# Patient Record
Sex: MALE | URBAN - METROPOLITAN AREA
[De-identification: names, ages, dates, MRNs, and addresses within clinical notes are randomized per-mention and may not be internally consistent; named-entity substitution may affect disease eponyms.]

---

## 2024-05-16 ENCOUNTER — TELEPHONE (OUTPATIENT)
Dept: TRANSPLANT | Facility: CLINIC | Age: 69
End: 2024-05-16

## 2024-05-23 ENCOUNTER — TELEPHONE (OUTPATIENT)
Dept: TRANSPLANT | Facility: CLINIC | Age: 69
End: 2024-05-23

## 2024-06-17 ENCOUNTER — TELEPHONE (OUTPATIENT)
Dept: TRANSPLANT | Facility: CLINIC | Age: 69
End: 2024-06-17

## 2024-06-17 NOTE — TELEPHONE ENCOUNTER
----- Message from Germaine Evans DO sent at 6/17/2024  3:07 PM CDT -----  Regarding: RE: High Risk Referral  Does not sound like a candidate (midodrine use, hx of systolic HF, COPD with concurrent Plavix/Eliquis)    Germaine  ----- Message -----  From: Venu Brenner RN  Sent: 6/17/2024   2:51 PM CDT  To: Claudio Brito MD; Tena Newby MD; #  Subject: High Risk Referral                               Good afternoon Jacky,    I received a referral on Mr. Chambers, a 68 year old Male who started dialysis (9/9/2023). His history includes DM, HTN, DVT, Chronic systolic CHF, GALO, Hyperlipidemia, Atherosclerotic Heart Dx, CAD with stents and COPD. Patient is on midodrine, Plavix and Eliquis.    Based on this information is this patient eligible for RR Evaluation?    Thanks  Kandice

## 2024-06-28 ENCOUNTER — DOCUMENTATION ONLY (OUTPATIENT)
Dept: TRANSPLANT | Facility: CLINIC | Age: 69
End: 2024-06-28